# Patient Record
Sex: MALE | Race: BLACK OR AFRICAN AMERICAN
[De-identification: names, ages, dates, MRNs, and addresses within clinical notes are randomized per-mention and may not be internally consistent; named-entity substitution may affect disease eponyms.]

---

## 2017-07-07 ENCOUNTER — HOSPITAL ENCOUNTER (EMERGENCY)
Dept: HOSPITAL 35 - ER | Age: 57
Discharge: HOME | End: 2017-07-07
Payer: COMMERCIAL

## 2017-07-07 VITALS — SYSTOLIC BLOOD PRESSURE: 139 MMHG | DIASTOLIC BLOOD PRESSURE: 74 MMHG

## 2017-07-07 VITALS — DIASTOLIC BLOOD PRESSURE: 91 MMHG | SYSTOLIC BLOOD PRESSURE: 163 MMHG

## 2017-07-07 VITALS — HEIGHT: 74 IN | BODY MASS INDEX: 38.5 KG/M2 | WEIGHT: 300.01 LBS

## 2017-07-07 DIAGNOSIS — Z87.891: ICD-10-CM

## 2017-07-07 DIAGNOSIS — R07.9: Primary | ICD-10-CM

## 2017-07-07 DIAGNOSIS — F10.99: ICD-10-CM

## 2017-07-07 DIAGNOSIS — K21.9: ICD-10-CM

## 2017-07-07 DIAGNOSIS — J45.909: ICD-10-CM

## 2017-07-07 DIAGNOSIS — I10: ICD-10-CM

## 2017-07-07 LAB
ANION GAP SERPL CALC-SCNC: 9 MMOL/L (ref 7–16)
BASOPHILS NFR BLD AUTO: 0.5 % (ref 0–2)
BUN SERPL-MCNC: 10 MG/DL (ref 7–18)
CALCIUM SERPL-MCNC: 8.9 MG/DL (ref 8.5–10.1)
CHLORIDE SERPL-SCNC: 104 MMOL/L (ref 98–107)
CO2 SERPL-SCNC: 26 MMOL/L (ref 21–32)
CREAT SERPL-MCNC: 1 MG/DL (ref 0.7–1.3)
EOSINOPHIL NFR BLD: 2.7 % (ref 0–3)
ERYTHROCYTE [DISTWIDTH] IN BLOOD BY AUTOMATED COUNT: 13.1 % (ref 10.5–14.5)
GLUCOSE SERPL-MCNC: 116 MG/DL (ref 74–106)
GRANULOCYTES NFR BLD MANUAL: 68.4 % (ref 36–66)
HCT VFR BLD CALC: 38.7 % (ref 42–52)
HGB BLD-MCNC: 13.7 GM/DL (ref 14–18)
LYMPHOCYTES NFR BLD AUTO: 20.9 % (ref 24–44)
MANUAL DIFFERENTIAL PERFORMED BLD QL: NO
MCH RBC QN AUTO: 32.9 PG (ref 26–34)
MCHC RBC AUTO-ENTMCNC: 35.4 G/DL (ref 28–37)
MCV RBC: 92.9 FL (ref 80–100)
MONOCYTES NFR BLD: 7.5 % (ref 1–8)
NEUTROPHILS # BLD: 5.6 THOU/UL (ref 1.4–8.2)
NT-PRO BRAIN NAT PEPTIDE: 9 PG/ML (ref ?–300)
PLATELET # BLD: 374 THOU/UL (ref 150–400)
POTASSIUM SERPL-SCNC: 4 MMOL/L (ref 3.5–5.1)
RBC # BLD AUTO: 4.17 MIL/UL (ref 4.5–6)
SODIUM SERPL-SCNC: 139 MMOL/L (ref 136–145)
TROPONIN I SERPL-MCNC: < 0.04 NG/ML
WBC # BLD AUTO: 8.2 THOU/UL (ref 4–11)

## 2019-04-07 ENCOUNTER — HOSPITAL ENCOUNTER (EMERGENCY)
Dept: HOSPITAL 35 - ER | Age: 59
Discharge: HOME | End: 2019-04-07
Payer: COMMERCIAL

## 2019-04-07 VITALS — SYSTOLIC BLOOD PRESSURE: 155 MMHG | DIASTOLIC BLOOD PRESSURE: 83 MMHG

## 2019-04-07 VITALS — BODY MASS INDEX: 38.5 KG/M2 | HEIGHT: 74 IN | WEIGHT: 300.01 LBS
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
## 2019-04-07 DIAGNOSIS — K21.9: ICD-10-CM

## 2019-04-07 DIAGNOSIS — S91.312A: ICD-10-CM
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
## 2019-04-07 DIAGNOSIS — I10: ICD-10-CM

## 2019-04-07 DIAGNOSIS — Z87.891: ICD-10-CM

## 2019-04-07 DIAGNOSIS — S92.422A: Primary | ICD-10-CM
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
## 2019-04-07 DIAGNOSIS — J45.909: ICD-10-CM

## 2020-01-02 ENCOUNTER — HOSPITAL ENCOUNTER (EMERGENCY)
Dept: HOSPITAL 35 - ER | Age: 60
Discharge: HOME | End: 2020-01-02
Payer: COMMERCIAL

## 2020-01-02 VITALS — SYSTOLIC BLOOD PRESSURE: 147 MMHG | DIASTOLIC BLOOD PRESSURE: 66 MMHG

## 2020-01-02 VITALS — BODY MASS INDEX: 40.43 KG/M2 | HEIGHT: 74 IN | WEIGHT: 315 LBS

## 2020-01-02 DIAGNOSIS — J45.909: ICD-10-CM

## 2020-01-02 DIAGNOSIS — R06.00: Primary | ICD-10-CM

## 2020-01-02 DIAGNOSIS — E66.9: ICD-10-CM

## 2020-01-02 DIAGNOSIS — I10: ICD-10-CM

## 2020-01-02 DIAGNOSIS — K21.9: ICD-10-CM

## 2020-01-02 DIAGNOSIS — Z87.891: ICD-10-CM

## 2020-01-02 DIAGNOSIS — R53.83: ICD-10-CM

## 2020-01-02 LAB
ALBUMIN SERPL-MCNC: 3.8 G/DL (ref 3.4–5)
ALT SERPL-CCNC: 38 U/L (ref 30–65)
ANION GAP SERPL CALC-SCNC: 11 MMOL/L (ref 7–16)
AST SERPL-CCNC: 25 U/L (ref 15–37)
BASOPHILS NFR BLD AUTO: 0.8 % (ref 0–2)
BILIRUB SERPL-MCNC: 0.4 MG/DL
BILIRUB UR-MCNC: NEGATIVE MG/DL
BUN SERPL-MCNC: 16 MG/DL (ref 7–18)
CALCIUM SERPL-MCNC: 9.9 MG/DL (ref 8.5–10.1)
CHLORIDE SERPL-SCNC: 104 MMOL/L (ref 98–107)
CO2 SERPL-SCNC: 24 MMOL/L (ref 21–32)
COLOR UR: YELLOW
CREAT SERPL-MCNC: 1.3 MG/DL (ref 0.7–1.3)
EOSINOPHIL NFR BLD: 1 % (ref 0–3)
ERYTHROCYTE [DISTWIDTH] IN BLOOD BY AUTOMATED COUNT: 13.5 % (ref 10.5–14.5)
GLUCOSE SERPL-MCNC: 114 MG/DL (ref 74–106)
GRANULOCYTES NFR BLD MANUAL: 75.8 % (ref 36–66)
HCT VFR BLD CALC: 38 % (ref 42–52)
HGB BLD-MCNC: 13 GM/DL (ref 14–18)
KETONES UR STRIP-MCNC: (no result) MG/DL
LYMPHOCYTES NFR BLD AUTO: 15.6 % (ref 24–44)
MCH RBC QN AUTO: 32.6 PG (ref 26–34)
MCHC RBC AUTO-ENTMCNC: 34.1 G/DL (ref 28–37)
MCV RBC: 95.6 FL (ref 80–100)
MONOCYTES NFR BLD: 6.8 % (ref 1–8)
NEUTROPHILS # BLD: 9.5 THOU/UL (ref 1.4–8.2)
PLATELET # BLD: 353 THOU/UL (ref 150–400)
POTASSIUM SERPL-SCNC: 4.4 MMOL/L (ref 3.5–5.1)
PROT SERPL-MCNC: 8.2 G/DL (ref 6.4–8.2)
RBC # BLD AUTO: 3.98 MIL/UL (ref 4.5–6)
RBC # UR STRIP: NEGATIVE /UL
SODIUM SERPL-SCNC: 139 MMOL/L (ref 136–145)
SP GR UR STRIP: >= 1.03 (ref 1–1.03)
TROPONIN I SERPL-MCNC: <0.06 NG/ML (ref ?–0.06)
URINE CLARITY: CLEAR
URINE GLUCOSE-RANDOM*: NEGATIVE
URINE LEUKOCYTES-REFLEX: NEGATIVE
URINE NITRITE-REFLEX: NEGATIVE
URINE PROTEIN (DIPSTICK): NEGATIVE
UROBILINOGEN UR STRIP-ACNC: 1 E.U./DL (ref 0.2–1)
WBC # BLD AUTO: 12.6 THOU/UL (ref 4–11)

## 2020-01-10 ENCOUNTER — HOSPITAL ENCOUNTER (OUTPATIENT)
Dept: HOSPITAL 35 - SJCVCIMAG | Age: 60
Discharge: HOME | End: 2020-01-10
Attending: INTERNAL MEDICINE
Payer: COMMERCIAL

## 2020-01-10 DIAGNOSIS — I11.9: ICD-10-CM

## 2020-01-10 DIAGNOSIS — J45.40: ICD-10-CM

## 2020-01-10 DIAGNOSIS — R94.31: ICD-10-CM

## 2020-01-10 DIAGNOSIS — I25.10: ICD-10-CM

## 2020-01-10 DIAGNOSIS — E66.9: ICD-10-CM

## 2020-01-10 DIAGNOSIS — E78.00: ICD-10-CM

## 2020-01-10 DIAGNOSIS — I45.10: ICD-10-CM

## 2020-01-10 DIAGNOSIS — Z13.6: Primary | ICD-10-CM

## 2020-08-27 ENCOUNTER — HOSPITAL ENCOUNTER (OUTPATIENT)
Dept: HOSPITAL 35 - RAD | Age: 60
End: 2020-08-27
Attending: NURSE PRACTITIONER
Payer: COMMERCIAL

## 2020-08-27 DIAGNOSIS — M54.30: Primary | ICD-10-CM

## 2020-11-03 ENCOUNTER — HOSPITAL ENCOUNTER (OUTPATIENT)
Dept: HOSPITAL 35 - MRI | Age: 60
End: 2020-11-03
Attending: FAMILY MEDICINE
Payer: COMMERCIAL

## 2020-11-03 DIAGNOSIS — M54.40: ICD-10-CM

## 2020-11-03 DIAGNOSIS — M48.061: Primary | ICD-10-CM

## 2020-11-03 DIAGNOSIS — M51.26: ICD-10-CM

## 2020-12-16 ENCOUNTER — HOSPITAL ENCOUNTER (OUTPATIENT)
Dept: HOSPITAL 35 - PAIN | Age: 60
End: 2020-12-16
Attending: ANESTHESIOLOGY
Payer: COMMERCIAL

## 2020-12-16 VITALS — WEIGHT: 300.01 LBS | BODY MASS INDEX: 38.5 KG/M2 | HEIGHT: 74.02 IN

## 2020-12-16 VITALS — DIASTOLIC BLOOD PRESSURE: 85 MMHG | SYSTOLIC BLOOD PRESSURE: 151 MMHG

## 2020-12-16 DIAGNOSIS — Z79.899: ICD-10-CM

## 2020-12-16 DIAGNOSIS — Z79.891: ICD-10-CM

## 2020-12-16 DIAGNOSIS — M51.16: ICD-10-CM

## 2020-12-16 DIAGNOSIS — M48.061: ICD-10-CM

## 2020-12-16 DIAGNOSIS — G89.4: Primary | ICD-10-CM

## 2020-12-16 NOTE — HPC
[facility]
[place]   [address]                     PAIN MANAGEMENT CONSULTATION  
_______________________________________________________________________________
 
Name:       RADHA BORJA               Room #:                     REG JIMENA 
LEVI#:      [id number]                       Account #:      [id number]  
Admission:  12/16/20    Attend Phys:    Sina Stephenson DO  
Discharge:              Date of Birth:  [birth year]  
                                                          Report #: [id number]
                                                                    [id number]   
_______________________________________________________________________________
THIS REPORT FOR:  
 
cc:  Sina Scott James A. DO Johnson, James E. DO                                          ~
DATE OF SERVICE:  12/16/2020
 
 
REFERRING PHYSICIAN:  Sina Scott DO
 
CHIEF COMPLAINT:  Low back pain, left lower extremity pain with paresthesias.
 
HISTORY OF PRESENT ILLNESS:  As you know, the patient is a pleasant 60-year-old
male who reports acute onset of low back pain, left lower extremity pain with
paresthesias began 09/2020.  The patient denies any specific injury or trauma
that may have led to development of symptoms.  He initially trialed conservative
treatment utilizing over-the-counter medications, rest, relaxation, but did not
notice improvement in symptoms.  He has had previous back pain in the past, but
this usually presented spontaneously and resolved spontaneously with rest,
relaxation and over-the-counter therapies.  The patient sought evaluation
through his primary care physician, Dr. Sina Scott who trialled medication
such as cyclobenzaprine to help with pain.  Unfortunately, his symptoms did not
provide improved significantly.  He was subsequently then referred for MRI,
which showed changes at the L3-L4 level consistent with the patient's
distribution of symptoms and the patient was subsequently referred to our clinic
to discuss treatment options for lumbar radiculopathy.
 
The patient indicates today his pain is steady with periodic exacerbations of
symptoms.  He describes the pain as aching, throbbing, intermittent numbness and
tingling.  Places current pain score 5/10, daily average of 5/10, worst pain has
been is 8/10.  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
controls on the bus exacerbate symptoms.  He states that sitting down tends to
improve pain.  He has been referred to our service to discuss treatment options
for suspected lumbar radiculopathy.
 
PAST MEDICAL HISTORY:
1.  Allergic rhinitis.
2.  Asthma.
3.  Gastroesophageal reflux disease.
4.  Hypertension.
5.  Hyperlipidemia.
6.  Sleep apnea.
 
PAST SURGICAL HISTORY:  Foot surgery.
 
SOCIAL HISTORY:  The patient is a reformed smoker.  He has 20-pack-year history
 
 
 
[facility]
[address] [place]   [address]                     PAIN MANAGEMENT CONSULTATION  
_______________________________________________________________________________
 
Name:       JONHRADHA LUND               Room #:                     REG CINDYSUJATA SIMS#:      [id number]                       Account #:      [id number]  
Admission:  12/16/20    Attend Phys:    Sina Stephenson DO  
Discharge:              Date of Birth:  [birth year]  
                                                          Report #: [id number]
                                                                    [id number]   
_______________________________________________________________________________
of smoking.  Denies IV or illicit drug use.  Admits to approximately 2-3 alcohol
beverages per day.  He is currently employed as a [employer] .  He is working,
not receiving workmen's compensation nor is trying to obtain disability
benefits.  He is accompanied by his significant other present in room today.  He
is not in litigation in regards to pain.
 
REVIEW OF SYSTEMS:  Positive for asthma, wheezing, low back pain, left lower
extremity pain and paresthesias.  All other review of systems negative per
12-point review of systems other than those listed in history of present
illness.  Pain impact score 46/70 indicating severe interference of daily
activities secondary to pain.
 
ALLERGIES:  No known drug allergies.
 
CURRENT MEDICATIONS:  Losartan/hydrochlorothiazide 100/12.5 mg once a day,
loratadine 10 mg once a day, albuterol 2 puffs q. 4 hours p.r.n., montelukast
sodium 10 mg once a day.
 
IMAGING:  MRI of lumbar spine obtained 11/03/2020 shows T12-L1 unremarkable,
L1-L2 unremarkable, L2-L3 shows unremarkable findings.  L3-L4 mild broad-based
disk bulge, mild bilateral facet arthrosis, no significant central canal
stenosis.  There is severe bilateral neural foraminal stenosis, left greater
than right due to foraminal disk bulging and mild facet spurring.  L4-L5 shows,
broad-based posterior disk bulge with a 2 x 4 mm annular tear within the right
foraminal disk area.  There is mild to moderate bilateral facet arthrosis. 
There is facet spurring resulting in severe bilateral neural foraminal stenosis.
 L5-S1, shows minimal posterior disk bulging, mild bilateral facet arthrosis, no
significant central canal and no neural foraminal stenosis.
 
PHYSICAL EXAMINATION:
VITAL SIGNS:  Blood pressure 151/85, pulse 72, respiratory rate 16 and
unlabored.  The patient is 100% on room air, height 6 feet 2 inches tall, weight
300 pounds, BMI calculated 38.5.
GENERAL:  Well-developed, well-nourished, well-hydrated exogenously obese
60-year-old male appearing stated age.  He is in no acute distress, awake, alert
and oriented x 3.  Current pain score 5/10.
HEENT:  Normocephalic, atraumatic.  Pupils equal, round and reactive to light.
NEUROLOGIC:  Speech is fluent.  The patient is deemed an excellent historian. 
He is wearing a mask in compliance with COVID-19 regulations.
LUNGS:  Clear, no wheeze, rhonchi, no rales.
CARDIOVASCULAR:  Regular.  No appreciable gallop, no rub.
ABDOMEN:  Soft, obese, normoactive bowel sounds.
EXTREMITIES:  Show no clubbing, no cyanosis.  No appreciable edema.
MUSCULOSKELETAL:  Lower extremity strength equal and symmetrical 5/5.  Slight
giveaway strength noted with hip flexion, knee extension on left when compared
to the right due to pain generated from the low back, buttock and into the knee
 
 
 
[facility]
[address] [place]   [address]                     PAIN MANAGEMENT CONSULTATION  
_______________________________________________________________________________
 
Name:       JONHRADHA E               Room #:                     REG SUJATA SIMS#:      [id number]                       Account #:      [id number]  
Admission:  12/16/20    Attend Phys:    Sina Stephenson DO  
Discharge:              Date of Birth:  [birth year]  
                                                          Report #: [id number]
                                                                    [id number]   
_______________________________________________________________________________
area consistent with an L4 radiculopathy, possible contribution of L3 due to
foraminal stenosis.  There is no change in muscular tone when comparing left
lower extremity to right.  Muscle bulk is equal and symmetrical in comparing
left lower extremity to right.  Seated straight leg raising is mildly positive
on the left.  Supine straight leg raising positive on the left.  Amy's test is
negative.  Modified Gaenslen's positive for axial low back pain.  Ankle clonus
negative.  Babinski is negative.  Gait appears mildly antalgic favoring the left
lower extremity.  Lumbar provocation testing is met with axial back pain, no
radiation of symptoms.
 
ASSESSMENT:
1.  Symptomatic lumbar radiculopathy.
2.  Severe neural foraminal stenosis of the lumbar spine.
3.  Facet arthropathy of the lumbar spine.
4.  Lumbar degeneration.
5.  Chronic intractable pain.
 
PLAN:
1.  Based on today's physical exam and history the patient has provided, the
description the patient uses in regards to pain as well as location of symptoms
and descriptors the patient uses in regards to pain, likely source of the
patient's symptoms is a lumbar radiculopathy.  The patient and I reviewed his
MRI imaging, it does appear the symptoms that he is currently experiencing are
coming from the L4-L5 level, though there is a likely contribution due to the
bilateral neural foraminal stenosis at the L3-L4 level of a contribution at the
L3 on the left side as well.  The patient is having difficulty determining
exactly the distribution of symptoms, which is more difficult to determine the
actual dermatomal distribution today, though he does discuss anterior thigh
consistent with an L3, but also pain all the way to the inferior portion of the
knee consistent with an L4 distribution.  After we discussed the findings of his
MRI we correlated what we could to the symptoms he is experiencing and then
advise the patient the treatment options following was discussed with the
patient today.
 
We discussed physical therapy, stretching exercises and core strengthening as
well as a concerted effort at weight loss to assist in pain control.  We
discussed adjustments in medication management utilizing nonsteroidal
anti-inflammatory and a consistent neuropathic medication to assist in pain
control.  We discussed epidural injections under fluoroscopic guidance to
address lumbar radicular symptoms as rapidly as possible.  We also discussed
surgical options with the patient today including spinal cord stimulator therapy
and surgical options.  After reviewing risks and benefits of all proposed
treatment options, the patient chose to begin with conservative treatment.
 
The patient will be sent for physical therapy 3 times a week for 6 weeks.  The
patient will begin the physical therapy as quickly as possible.  I advised the
 
 
 
[address]   [address]                     PAIN MANAGEMENT CONSULTATION  
_______________________________________________________________________________
 
Name:       RADHA BORJA               Room #:                     COLLINS SIMS#:      [id number]                       Account #:      [id number]  
Admission:  12/16/20    Attend Phys:    Sina Stephenson DO  
Discharge:              Date of Birth:  [birth year]  
                                                          Report #: [id number]
                                                                    [id number]   
_______________________________________________________________________________
patient to continue the physical therapy as necessary.  If he is able to obtain
the information, he can do the physical therapy at home.  He does not have to
continue formalized physical therapy, this in conjunction with a concerted
effort at weight loss would be quite beneficial for the patient overall.  He
will begin the physical therapy as quickly as possible.  Prescription was
provided to the patient today.
2.  The patient will be started on diclofenac 50 mg dose 1 tab p.o. t.i.d.  I
have given the patient #90 tablets.  I advised the patient to watch for side
effects of this medication including dyspepsia, worsening of blood pressure,
lower extremity edema.  If he notes any side effects, discontinue immediately. 
The prescription was sent via e-scribe to local pharmacy.  I did advise the
patient not to take this medication with any other nonsteroidal
anti-inflammatory.
3.  The patient will be started on gabapentin 300 mg dose 1 tab p.o. at bedtime
for 5 nights, then 2 tabs p.o. at bedtime for 5 nights, then 3 tabs p.o. at
bedtime for 5 nights.  If no improvement in symptoms, no side effects of
sleepiness, disorientation, confusion, mental slowing, then increase to 1 tab in
the morning and 3 tabs at night.  This is the first set of titration plans.  If
further escalation will be necessary, we will be seeing the patient back in
followup visit.  I have given the patient #120 tablets to take the medication as
directed.  I did advise the patient at any time during the titration, he notes
improvement in symptoms, stabilize at that dose, no further escalation. 
Prescription was sent via e-scribe to local pharmacy with 2 refills.
4.  We plan to see the patient back in followup visit in 30 days.  At that time,
review the efficacy of the medications and the physical therapy.  The patient
received referrals for today.  If the patient is continuing to experience
symptoms, we will discuss the possibility of a lumbar epidural injection or
adjustments in medication management if necessary.
5.  We wish to thank Dr. Sina Scott for the opportunity to see the patient
in consultation.  We will keep you apprised of his response to treatment as we
address lumbar radicular symptoms.  Again, we wish to thank you for the
opportunity to see the patient in consultation.
 
 
 
 
 
 
 
 
 
 
 
 
                         
   By:                               
                   
D: 12/16/20 1804                           _____________________________________
T: 12/16/20 2301                           Sina Stephenson DO            /nt

## 2020-12-16 NOTE — NUR
Pain Clinic Assessment:
 
1. History of Osteoarthritis:
NONE
   History of Rheumatoid Arthritis:
NONE
 
2. Height: 6 ft. 2 in. 188.0 cm.
   Weight: 300.0 lb.  oz. 136.080 kg.
   Patient's BMI: 38.5
 
3. Vital Signs:
   BP: 151/85 Pulse: 72 Resp: 16
   Temp:  02 Sat: 100 ECG Mon:
 
4. Pain Intensity: 5
 
5. Fall Risk:
   Dizziness: Y  Needs help standing or walking: N
   Fallen in the last 3 months: N
   Fall risk comments:
 
 
6. Patient on Blood Thinner: None
 
7. History of Hypertension: Y
 
8. Opioid Therapy greater than 6 weeks: N
   Opiate Contract Signed:
 
9. Risk Assessment Tool Provided:
 
10. Functional Assessment Tool:
 
11. Recreational Drug Use: Never Drug Type:
    Tobacco Use: Never Smoker Tobacco Type:
       Amount or Packs/day:  How Many Years:
    Alcohol Use: Yes  Frequency: Daily Quant: 2-4

## 2021-01-13 ENCOUNTER — HOSPITAL ENCOUNTER (OUTPATIENT)
Dept: HOSPITAL 35 - PAIN | Age: 61
Discharge: HOME | End: 2021-01-13
Attending: ANESTHESIOLOGY
Payer: COMMERCIAL

## 2021-01-13 VITALS — SYSTOLIC BLOOD PRESSURE: 158 MMHG | DIASTOLIC BLOOD PRESSURE: 81 MMHG

## 2021-01-13 VITALS — BODY MASS INDEX: 40.3 KG/M2 | WEIGHT: 314 LBS | HEIGHT: 74.02 IN

## 2021-01-13 DIAGNOSIS — M47.26: ICD-10-CM

## 2021-01-13 DIAGNOSIS — Z98.890: ICD-10-CM

## 2021-01-13 DIAGNOSIS — Z79.899: ICD-10-CM

## 2021-01-13 DIAGNOSIS — M51.16: Primary | ICD-10-CM

## 2021-01-13 DIAGNOSIS — Z87.891: ICD-10-CM

## 2021-01-13 DIAGNOSIS — M48.061: ICD-10-CM

## 2021-01-13 DIAGNOSIS — G89.29: ICD-10-CM

## 2021-01-13 DIAGNOSIS — E66.09: ICD-10-CM

## 2021-01-13 NOTE — NUR
Pain Clinic Assessment:
 
1. History of Osteoarthritis:
NONE
   History of Rheumatoid Arthritis:
NONE
 
2. Height: 6 ft. 2 in. 188.0 cm.
   Weight: 314.0 lb.  oz. 142.430 kg.
   Patient's BMI: 40.3
 
3. Vital Signs:
   BP: 158/81 Pulse: 96 Resp: 18
   Temp:  02 Sat: 97 ECG Mon:
 
4. Pain Intensity: 0
 
5. Fall Risk:
   Dizziness: N  Needs help standing or walking: N
   Fallen in the last 3 months: N
   Fall risk comments:
 
 
6. Patient on Blood Thinner: None
 
7. History of Hypertension: Y
 
8. Opioid Therapy greater than 6 weeks: N
   Opiate Contract Signed:
 
9. Risk Assessment Tool Provided: 0-LOW
 
10. Functional Assessment Tool: 46/70
 
11. Recreational Drug Use: Never Drug Type:
    Tobacco Use: Never Smoker Tobacco Type:
       Amount or Packs/day:  How Many Years:
    Alcohol Use: Yes  Frequency: Daily Quant: 2-3 BEER

## 2021-01-19 NOTE — HPC
[facility]
[place]   [address]                     PAIN MANAGEMENT CONSULTATION  
_______________________________________________________________________________
 
Name:       RADHA BORJA               Room #:                     REG JIMENA FARRELLMariahCASPERMariah#:      [id number]                       Account #:      [id number]  
Admission:  01/13/21    Attend Phys:    Sina Stephenson DO  
Discharge:              Date of Birth:  [birth year]  
                                                          Report #: [id number]
                                                                    [id number]   
_______________________________________________________________________________
THIS REPORT FOR:  
 
cc:  Sina Scott James A. DO Johnson, James E. DO                                          ~
DATE OF SERVICE:  01/13/2021
 
 
REFERRING PHYSICIAN: Sina Scott MD
 
CHIEF COMPLAINT:  Low back pain, left lower extremity pain and paresthesias.
 
HISTORY OF PRESENT ILLNESS:  As you know, the patient is a very pleasant
60-year-old male who reports acute onset of low back pain, left lower extremity
pain and paresthesias that began 09/20/2020.  Denied any specific injury or
trauma that led to symptom development.  We saw the patient in consultation per
the request of Dr. Scott on 12/16/2020, diagnosed with lumbar radiculopathy
secondary to severe neural foraminal stenosis of the lumbar spine.  He chose to
begin with medication management.  We started him on a titration of gabapentin. 
He reached a level of about 600 mg 3 times a day, began to experience swelling
in the hands and feet.  He is reduced down now to 600 mg b.i.d.  The swelling
has improved, but his pain remains at a level of up to 5/10.  He returns today
to discuss the possibility of undergoing a lumbar epidural injection under
fluoroscopic guidance.
 
ALLERGIES:  No known drug allergies.
 
CURRENT MEDICATIONS:  Losartan/hydrochlorothiazide 100/12.5 mg once a day,
loratadine 10 mg once a day, albuterol 2 puffs q. 4 hours p.r.n., montelukast
sodium 10 mg once a day.
 
SOCIAL HISTORY:  The patient is a reformed smoker.  He has 20-pack-year history
of smoking.  Denies IV or illicit drug use.  Admits to 2-3 alcohol beverages per
day.  He is employed, unaccompanied at today's visit.
 
IMAGING:  No new imaging available.
 
PHYSICAL EXAMINATION:
VITAL SIGNS:  Blood pressure 158/81, pulse 96, respiratory rate 18 and
unlabored.  The patient is 97% on room air, height 6 feet 2 inches tall, weight
314 pounds, BMI calculated 40.3.
GENERAL:  Well-developed, well-nourished, well-hydrated, class III, morbidly
obese 60-year-old male appearing stated age, pain is rated up to 5/10.
HEENT:  Normocephalic, atraumatic.  Pupils equal, round and reactive.
NEUROLOGIC:  Speech is fluent.  The patient is in a mask secondary to compliance
with COVID-19 regulations.
 
 
 
[place] [address]                     PAIN MANAGEMENT CONSULTATION  
_______________________________________________________________________________
 
Name:       RADHA BORJA               Room #:                     REG CLI 
LEVI#:      [id number]                       Account #:      [id number]  
Admission:  01/13/21    Attend Phys:    Sina Stephenson DO  
Discharge:              Date of Birth:  [birth year]  
                                                          Report #: [id number]
                                                                    [id number]   
_______________________________________________________________________________
EXTREMITIES:  Show no clubbing, no cyanosis.  No appreciable edema.
MUSCULOSKELETAL:  Seated straight leg raising is mildly positive on the left. 
Supine straight leg raising positive on the left.  Gait is mildly antalgic
favoring left lower extremity over right.
 
ASSESSMENT:
1.  Symptomatic lumbar radiculopathy.
2.  Severe neural foraminal stenosis of lumbar spine.
3.  Facet arthropathy of the lumbar spine.
4.  Lumbar degeneration.
5.  Chronic intractable pain.
 
PLAN:
1.  The patient returns today in followup visit to undergo lumbar epidural
injection under fluoroscopic guidance.  The patient has been advised of the
risks and the benefits of this procedure.  These risks include but are not
necessarily limited to bleeding, bruising, infection, worsening pain, no relief
of pain, also risk of temporary or permanent muscle weakness, temporary or
permanent nerve damage, possible paralysis, and death.  The patient states he
understood and wished to proceed.
2.  No medication changes made at today's visit.  The patient will continue
current medical therapy as prior prescribed.
3.  The patient and I did discuss his response to gabapentin therapy.  It is
unfortunate the patient was experiencing side effects of swelling in the hands
and feet.  We have requested that the patient continue to wean off the
medication.  We gave him weaning parameters to come off of medication entirely. 
We are hopeful the patient will see benefit with today's epidural injection.  If
we determine the patient will need medications, again, we will keep you apprised
of his response.
 
PROCEDURE NOTE
 
DESCRIPTION OF PROCEDURE:  L4-L5 left paramedian epidural steroid injection
under fluoroscopic guidance.
 
This is the first procedure of the first series that the patient is undergoing.
 
After obtaining written consent, the patient was taken back to the fluoroscopy
suite, placed in a prone position with pillow under the abdomen to decrease
lumbar lordosis.  The skin overlying the lumbosacral area was then prepped and
draped in aseptic fashion.  The L4-L5 vertebral interspace was then identified
by AP fluoroscopy.  The skin and subcutaneous tissue overlying the target site
of injection was anesthetized with 3 mL 1% lidocaine.
 
A 20-gauge 4-1/2 inch Tuohy needle was then advanced under fluoroscopic guidance
towards the epidural space using a left paramedian approach.  The epidural space
 
 
 
[address]   [address]                     PAIN MANAGEMENT CONSULTATION  
_______________________________________________________________________________
 
Name:       RADHA BORJA               [facility] #[facility]                     [facility]      [id number]                       Account #:      [id number]  
Admission:  01/13/21    Attend Phys:    Sina Stephenson DO  
Discharge:              Date of Birth:  [birth year]  
                                                          Report #: [id number]
                                                                    [id number]   
_______________________________________________________________________________
was identified using loss of resistance to air technique.  After negative
aspiration for heme or cerebrospinal fluid, a total of 1 mL of Omnipaque was
injected.  A lumbar epidurogram was confirmed using both AP and lateral
fluoroscopy.  After negative aspiration for heme or cerebrospinal fluid, 5 mL of
a solution containing 2 mL 40 mg per mL, 80 mg total triamcinolone along with 3
mL of lidocaine 1% was injected in increments.  Contrast spread was noted
posterior epidural space.  The needle was then retracted approximately half way
and needle tract flushed with 1 mL of 1% lidocaine.  Needle was then removed. 
There were no apparent sensory or motor deficits in the lower extremity
following the procedure.  A sterile bandage was placed over the injection site.
The heart rate, pulse, oximetry and blood pressure were continuously monitored
after the procedure.  There were no apparent complications.  The patient
tolerated the procedure well and was carefully escorted to the recovery room in
stable condition.  There were no apparent complications.  After meeting
discharge criteria, the patient was then discharged home.
 
 
 
 
 
 
 
 
 
 
 
 
 
 
 
 
 
 
 
 
 
 
 
 
 
 
 
 
 
  <ELECTRONICALLY SIGNED>
   By: Sina Stephenson DO          
  01/19/21     0802
D: 01/13/21 1604                           _____________________________________
T: 01/13/21 1711                           Sina Stephenson DO            /nt

## 2022-01-31 ENCOUNTER — HOSPITAL ENCOUNTER (OUTPATIENT)
Dept: HOSPITAL 35 - SJCVCIMAG | Age: 62
End: 2022-01-31
Attending: INTERNAL MEDICINE
Payer: COMMERCIAL

## 2022-01-31 DIAGNOSIS — I11.9: Primary | ICD-10-CM

## 2022-02-01 ENCOUNTER — HOSPITAL ENCOUNTER (OUTPATIENT)
Dept: HOSPITAL 35 - PAIN | Age: 62
Discharge: HOME | End: 2022-02-01
Attending: ANESTHESIOLOGY
Payer: COMMERCIAL

## 2022-02-01 VITALS — BODY MASS INDEX: 40.43 KG/M2 | HEIGHT: 74.02 IN | WEIGHT: 315 LBS

## 2022-02-01 VITALS — SYSTOLIC BLOOD PRESSURE: 150 MMHG | DIASTOLIC BLOOD PRESSURE: 75 MMHG

## 2022-02-01 DIAGNOSIS — G89.29: ICD-10-CM

## 2022-02-01 DIAGNOSIS — E66.9: ICD-10-CM

## 2022-02-01 DIAGNOSIS — Z98.890: ICD-10-CM

## 2022-02-01 DIAGNOSIS — M48.061: ICD-10-CM

## 2022-02-01 DIAGNOSIS — M47.26: ICD-10-CM

## 2022-02-01 DIAGNOSIS — Z79.899: ICD-10-CM

## 2022-02-01 DIAGNOSIS — M51.16: Primary | ICD-10-CM

## 2022-02-01 NOTE — HPC
[facility]
[place]   [address]                     PAIN MANAGEMENT CONSULTATION  
_______________________________________________________________________________
 
Name:       RADHA BORJA               Room #:                     REG JIMENA 
LEVI#:      [id number]                       Account #:      [id number]  
Admission:  02/01/22    Attend Phys:    Sina Stephenson DO  
Discharge:              Date of Birth:  [birth year]  
                                                          Report #: [id number]
                                                                    [id number] 
_______________________________________________________________________________
THIS REPORT FOR:  
 
cc:  Sina Scott,Sina Hutton DO                                          ~
 
cc:     Sina Scott DO
DATE OF SERVICE: 02/01/2022
 
REFERRING PHYSICIAN:  Dr. Sina Scott.
 
CHIEF COMPLAINT:  Low back pain, left lower extremity pain with paresthesias.
 
HISTORY OF PRESENT ILLNESS:  As you know, the patient is a very pleasant 
61-year-old male, reporting acute onset of low back pain, left lower extremity 
pain with paresthesias that began 09/20/2020.  The patient was seen in 
consultation per the request of Dr. Scott on 01/13/2021 where the patient 
underwent a lumbar epidural injection under fluoroscopic guidance.  He did very 
well with that injection.  In fact, he received near 100% improvement in overall
pain lasting until approximately 2 weeks ago.  He states he has had a slow and 
progressive return of symptoms, no inciting injury or trauma.  He has returned 
today in followup visit to request the next in the series of lumbar epidural 
injections to build on success of previous intervention.  The patient denies 
injury or trauma that may have led to symptom development.  He has had no 
changes in his medication management that would preclude him from undergoing the
requested epidural injection today.
 
ALLERGIES:  No known drug allergies.
 
CURRENT MEDICATIONS:  Montelukast sodium 10 mg once a day, albuterol 2 puffs q. 
4 hours p.r.n., loratadine 10 mg per day, losartan/hydrochlorothiazide 100/12.5 
mg once a day, diclofenac 50 mg once a day.
 
SOCIAL HISTORY:  The patient is a reformed smoker, 20-pack-year history of 
smoking.  Denies IV or illicit drug use.  Admits to 2-3 alcohol beverages per 
day.  He is employed, working, not receiving workmen's compensation, 
unaccompanied today.
 
IMAGING:  No new imaging available.
 
PHYSICAL EXAMINATION:
VITAL SIGNS:  Blood pressure 150/75, pulse 74, respiratory rate 16 and 
unlabored.  The patient 98% on room air.  Height 6 feet 2 inches tall, weight 
328 pounds, BMI calculated 42.1.
GENERAL:  Well-developed, well-nourished, well-hydrated class III, morbidly 
obese 61-year-old male, appearing stated age, pain is rated at 8/10.
 
 
 
[address]   [address]                     PAIN MANAGEMENT CONSULTATION  
_______________________________________________________________________________
 
Name:       RADHA BORJA               Room #:                     REG CLSUJATA SIMS#:      [id number]                       Account #:      [id number]  
Admission:  02/01/22    Attend Phys:    Sina Stephenson DO  
Discharge:              Date of Birth:  [birth year]  
                                                          Report #: [id number]
                                                                    [id number] 
_______________________________________________________________________________
HEENT:  Normocephalic, atraumatic.  Pupils are round.  He is wearing a mask in 
compliance with COVID-19 regulations.
EXTREMITIES:  Show no clubbing, no cyanosis.  No appreciable edema.
MUSCULOSKELETAL:  Seated straight leg raising is negative.  Supine straight leg 
raising positive on the left at about 60-degree angle.  So this is limited 
somewhat by body habitus.  Gait is mildly antalgic once again favoring the left 
lower extremity over the right.  Ankle clonus negative.  Babinski is negative.  
Deep tendon reflexes are equal and symmetrical at the patella and Achilles.
 
ASSESSMENT:
1.  Symptomatic lumbar radiculopathy.
2.  Severe neural foraminal stenosis of lumbar spine.
3.  Facet arthropathy of lumbar spine.
4.  Lumbar degeneration.
5.  Chronic intractable pain.
 
PLAN:
1.  The patient has returned today in followup visit to undergo lumbar epidural 
injection under fluoroscopic guidance.  He has reported near 100% improvement in
overall pain lasting for almost 1 year with the injection provided in 01/2021.  
The patient had been doing very well until about 2 weeks ago where he began to 
experience progressively worsening low back symptoms and left lower extremity 
pain.  He returns today in followup visit requesting a lumbar epidural injection
under fluoroscopic guidance.  The patient has been advised risks and benefits of
this procedure, states understood and wished to proceed.
2.  No medication changes made at today's visit.  The patient will continue 
current medical therapy as prior prescribed.
3.  We plan to see the patient back in followup visit on an as needed basis for 
the next in the series of lumbar epidural injections.  We are hopeful the 
patient once again see good and prolonged benefit with the injection provided 
today.
 
PROCEDURE NOTE.
 
DESCRIPTION OF PROCEDURE:  L4-L5 left paramedian epidural steroid injection 
under fluoroscopic guidance.
 
This is the second procedure of the first series that the patient is undergoing.
 
After obtaining written consent, the patient was taken back to the fluoroscopy 
suite, placed in a prone position with pillow under the abdomen to decrease 
lumbar lordosis.  The skin overlying the lumbosacral area was then prepped and 
draped in aseptic fashion.  The L4-L5 vertebral interspace was then identified 
by AP fluoroscopy.  The skin and subcutaneous tissue overlying the target site 
of injection was anesthetized with 3 mL 1% lidocaine.
 
 
 
 
[address]   [address]                     PAIN MANAGEMENT CONSULTATION  
_______________________________________________________________________________
 
Name:       RADHA BORJA               Room #:                     REG JIMENA FISCHER#:      [id number]                       Account #:      [id number]  
Admission:  02/01/22    Attend Phys:    Sina Stephenson DO  
Discharge:              Date of Birth:  [birth year]  
                                                          Report #: [id number]
                                                                    [id number] 
_______________________________________________________________________________
A 20-gauge Tuohy needle was then advanced under fluoroscopic guidance towards 
the epidural space using a left paramedian approach.  The epidural space was 
identified using loss of resistance to air technique.  After negative aspiration
for heme or cerebrospinal fluid, a total of 1 mL of Omnipaque was injected.  A 
lumbar epidurogram was confirmed using both AP and lateral  fluoroscopy.  After 
negative aspiration for heme or cerebrospinal fluid, 5 mL of a solution 
containing 2 mL 40 mg per mL 80 mg total triamcinolone along with 3 mL of 
lidocaine 1% was injected in increments.  Contrast spread was noted in the 
posterior epidural space.  The needle was then retracted approximately half way 
and needle tract flushed with 1 mL of 1% lidocaine.  Needle was then removed.  
There were no apparent sensory or motor deficits in the lower extremity 
following the procedure.  A sterile bandage was placed over the injection site.
 
The heart rate, pulse, oximetry and blood pressure were continuously monitored 
after the procedure.  There were no apparent complications.  The patient 
tolerated the procedure well and was carefully escorted to the recovery room in 
stable condition.  There were no apparent complications.  After meeting 
discharge criteria, the patient was then discharged home.
 
 
 
 
 
 
 
 
 
 
 
 
 
 
 
 
 
 
 
 
 
 
 
 
 
 
                         
   By:                               
                   
D: 02/01/22 1252                           _____________________________________
T: 02/01/22 2008                           Sina Stephenson,             /nt

## 2022-02-01 NOTE — NUR
Pain Clinic Assessment:
 
1. History of Osteoarthritis:
NONE
   History of Rheumatoid Arthritis:
NONE
 
2. Height: 6 ft. 2 in. 188.0 cm.
   Weight: 328.0 lb.  oz. 148.780 kg.
   Patient's BMI: 42.1
 
3. Vital Signs:
   BP: 150/75 Pulse: 74 Resp: 16
   Temp:  02 Sat: 98 ECG Mon:
 
4. Pain Intensity: 8
 
5. Fall Risk:
   Dizziness: N  Needs help standing or walking: N
   Fallen in the last 3 months: N
   Fall risk comments:
 
 
6. Patient on Blood Thinner: None
 
7. History of Hypertension: Y
 
8. Opioid Therapy greater than 6 weeks: N
   Opiate Contract Signed:
 
9. Risk Assessment Tool Provided: 0-LOW
 
10. Functional Assessment Tool: 46/70
 
11. Recreational Drug Use: Never Drug Type:
    Tobacco Use: Never Smoker Tobacco Type:
       Amount or Packs/day:  How Many Years:
    Alcohol Use: Yes  Frequency: Daily Quant: 4